# Patient Record
Sex: MALE | Race: WHITE | NOT HISPANIC OR LATINO | Employment: FULL TIME | URBAN - METROPOLITAN AREA
[De-identification: names, ages, dates, MRNs, and addresses within clinical notes are randomized per-mention and may not be internally consistent; named-entity substitution may affect disease eponyms.]

---

## 2023-11-20 ENCOUNTER — OFFICE VISIT (OUTPATIENT)
Dept: FAMILY MEDICINE | Facility: CLINIC | Age: 43
End: 2023-11-20
Payer: COMMERCIAL

## 2023-11-20 VITALS
WEIGHT: 176.3 LBS | RESPIRATION RATE: 12 BRPM | TEMPERATURE: 98 F | OXYGEN SATURATION: 98 % | HEART RATE: 57 BPM | HEIGHT: 69 IN | DIASTOLIC BLOOD PRESSURE: 82 MMHG | SYSTOLIC BLOOD PRESSURE: 121 MMHG | BODY MASS INDEX: 26.11 KG/M2

## 2023-11-20 DIAGNOSIS — Z23 NEED FOR IMMUNIZATION AGAINST YELLOW FEVER: Primary | ICD-10-CM

## 2023-11-20 PROCEDURE — 90471 IMMUNIZATION ADMIN: CPT | Mod: GA | Performed by: PHYSICIAN ASSISTANT

## 2023-11-20 PROCEDURE — 90717 YELLOW FEVER VACCINE SUBQ: CPT | Mod: GA | Performed by: PHYSICIAN ASSISTANT

## 2023-11-20 PROCEDURE — 99203 OFFICE O/P NEW LOW 30 MIN: CPT | Mod: 25 | Performed by: PHYSICIAN ASSISTANT

## 2023-11-20 RX ORDER — KETOCONAZOLE 20 MG/G
CREAM TOPICAL
COMMUNITY
Start: 2023-09-21

## 2023-11-20 NOTE — PROGRESS NOTES
Patient needs to get yellow fever vaccine as a new hire  for [employer]. They require the vaccine in case he needs to fly anywhere that requires it. No current trips planned.          SUBJECTIVE: Gil Lacey , a 43 year old  male, presents for counseling and information regarding upcoming travel to [place] to [place]. Special medical concerns include: none. He anticipates the following unusual exposures: none.    Itinerary:  [place]    Departure Date: as early as February Return date: unknown    Reason for travel (i.e. Business, pleasure): Business    Visiting an urban or rural area?: unknown    Accommodations (i.e. hotel, hostel, friends, family, etc): Hotel    Women - First day of your last period: n/a    IMMUNIZATION HISTORY  Have you received any vaccinations in the past 4 weeks?  yes  Have you ever fainted from having your blood drawn or from an injection?  No  Have you ever had a fever reaction to vaccination?  No  Have you ever had any bad reaction or side effect from any vaccination?  Yes- muscle aches and headache - not a bad reaction  Have you ever had hepatitis A or B vaccine?  Yes  Do you live (or work closely) with anyone who has AIDS, an AIDS-like condition, any other immune disorder or who is on chemotherapy for cancer?  No  Have you received any injection of immune globulin or any blood products during the past 12 months?  No    GENERAL MEDICAL HISTORY  Do you have a medical condition that warrants maintenance medication or physician follow-up?  No  Do you have a medical condition that is stable now, but that may recur while traveling?  No  Has your spleen been removed?  No  Have you had an acute illness or a fever in the past 48 hours?  No  Are you pregnant, or might you become pregnant on this trip?  Any chance of pregnancy?  N/a  Are you breastfeeding?  N/a  Do you have HIV, AIDS, an AIDS-like condition, any other immune disorder, leukemia or cancer?  No  Do you have a  severe combined immunodeficiency disease?  No  Have you had your thymus gland removed or history of problems with your thymus, such as myasthenia gravis, DiGeorge syndrome, or thymoma?  No    Do you have severe thrombocytopenia (low platelet count) or a coagulation disorder?  No  Have you ever had a convulsion, seizure, epilepsy, neurologic condition or brain infection?  No  Do you have any stomach conditions?  No  Do you have a G6PD deficiency?  No  Do you have severe renal or kidney impairment?  No  Do you have a history of psychiatric problems?  No  Do you have a problem with strange dreams and/or nightmares?  No  Do you have insomnia?  No  Do you have problems with vaginitis?  No  Do you have psoriasis?  No  Are you prone to motion sickness?  No  Have you ever had headaches, nausea, vomiting, or breathing problems from altitude exposure?  No      No past medical history on file.   Immunization History   Administered Date(s) Administered    COVID-19 12+ (2023-24) (MODERNA) 11/08/2023    COVID-19 Bivalent 18+ (Moderna) 12/12/2022    Influenza Vaccine >6 months (Alfuria,Fluzone) 10/27/2021    Influenza,INJ,MDCK,PF,Quad >6mo(Flucelvax) 12/12/2022, 11/08/2023       Current Outpatient Medications   Medication Sig Dispense Refill    ketoconazole (NIZORAL) 2 % external cream APPLY TO BOTH FEET DAILY.       No Known Allergies     EXAM: deferred    Immunizations discussed include: Yellow Fever      ASSESSMENT/PLAN:    (Z23) Need for immunization against yellow fever  (primary encounter diagnosis)    Comment: Patient was counseled about yellow fever vaccine including expected side effects. Administered today.    Plan: YELLOW FEVER, LIVE SQ              I have reviewed general recommendations for safe travel   including: food/water precautions, insect avoidance, safe sex   practices given high prevalence of HIV and other STDs,   roadway safety. Educational materials and links to the CDC   Traveler's health website have been  provided.    Total time 15 minutes, greater than 50 percent in counseling   and coordination of care.

## 2023-11-20 NOTE — NURSING NOTE
Prior to immunization administration, verified patients identity using patient s name and date of birth. Please see Immunization Activity for additional information.     Screening Questionnaire for Adult Immunization    Are you sick today?   No   Do you have allergies to medications, food, a vaccine component or latex?   No   Have you ever had a serious reaction after receiving a vaccination?   No   Do you have a long-term health problem with heart, lung, kidney, or metabolic disease (e.g., diabetes), asthma, a blood disorder, no spleen, complement component deficiency, a cochlear implant, or a spinal fluid leak?  Are you on long-term aspirin therapy?   No   Do you have cancer, leukemia, HIV/AIDS, or any other immune system problem?   No   Do you have a parent, brother, or sister with an immune system problem?   No   In the past 3 months, have you taken medications that affect  your immune system, such as prednisone, other steroids, or anticancer drugs; drugs for the treatment of rheumatoid arthritis, Crohn s disease, or psoriasis; or have you had radiation treatments?   No   Have you had a seizure, or a brain or other nervous system problem?   No   During the past year, have you received a transfusion of blood or blood    products, or been given immune (gamma) globulin or antiviral drug?   No   For women: Are you pregnant or is there a chance you could become       pregnant during the next month?   No   Have you received any vaccinations in the past 4 weeks?   Yes     Immunization questionnaire was positive for at least one answer.  Notified Sulaiman Tolentino PA-C.      Patient instructed to remain in clinic for 15 minutes afterwards, and to report any adverse reactions.     Screening performed by Nora Steve MA on 11/20/2023 at 10:16 AM.

## 2023-12-30 ENCOUNTER — HEALTH MAINTENANCE LETTER (OUTPATIENT)
Age: 43
End: 2023-12-30

## 2025-01-19 ENCOUNTER — HEALTH MAINTENANCE LETTER (OUTPATIENT)
Age: 45
End: 2025-01-19